# Patient Record
Sex: FEMALE | Race: WHITE | Employment: OTHER | URBAN - METROPOLITAN AREA
[De-identification: names, ages, dates, MRNs, and addresses within clinical notes are randomized per-mention and may not be internally consistent; named-entity substitution may affect disease eponyms.]

---

## 2018-11-20 ENCOUNTER — TRANSFERRED RECORDS (OUTPATIENT)
Dept: HEALTH INFORMATION MANAGEMENT | Facility: CLINIC | Age: 73
End: 2018-11-20

## 2019-11-04 ENCOUNTER — HEALTH MAINTENANCE LETTER (OUTPATIENT)
Age: 74
End: 2019-11-04

## 2020-02-16 ENCOUNTER — HEALTH MAINTENANCE LETTER (OUTPATIENT)
Age: 75
End: 2020-02-16

## 2020-09-03 ENCOUNTER — TRANSFERRED RECORDS (OUTPATIENT)
Dept: HEALTH INFORMATION MANAGEMENT | Facility: CLINIC | Age: 75
End: 2020-09-03

## 2020-09-09 ENCOUNTER — TRANSFERRED RECORDS (OUTPATIENT)
Dept: HEALTH INFORMATION MANAGEMENT | Facility: CLINIC | Age: 75
End: 2020-09-09

## 2020-09-29 ENCOUNTER — TRANSCRIBE ORDERS (OUTPATIENT)
Dept: OTHER | Age: 75
End: 2020-09-29

## 2020-09-29 DIAGNOSIS — H53.2 DIPLOPIA: Primary | ICD-10-CM

## 2020-10-05 ENCOUNTER — TELEPHONE (OUTPATIENT)
Dept: OPHTHALMOLOGY | Facility: CLINIC | Age: 75
End: 2020-10-05

## 2020-10-06 ENCOUNTER — OFFICE VISIT (OUTPATIENT)
Dept: OPHTHALMOLOGY | Facility: CLINIC | Age: 75
End: 2020-10-06
Attending: OPHTHALMOLOGY
Payer: COMMERCIAL

## 2020-10-06 VITALS — SYSTOLIC BLOOD PRESSURE: 158 MMHG | DIASTOLIC BLOOD PRESSURE: 72 MMHG

## 2020-10-06 DIAGNOSIS — H50.21 HYPERTROPIA OF RIGHT EYE: Primary | ICD-10-CM

## 2020-10-06 DIAGNOSIS — H53.2 DIPLOPIA: ICD-10-CM

## 2020-10-06 PROCEDURE — 92060 SENSORIMOTOR EXAMINATION: CPT | Performed by: OPHTHALMOLOGY

## 2020-10-06 PROCEDURE — 92285 EXTERNAL OCULAR PHOTOGRAPHY: CPT | Performed by: OPHTHALMOLOGY

## 2020-10-06 PROCEDURE — 92060 SENSORIMOTOR EXAMINATION: CPT | Mod: 26 | Performed by: OPHTHALMOLOGY

## 2020-10-06 PROCEDURE — 92004 COMPRE OPH EXAM NEW PT 1/>: CPT | Performed by: OPHTHALMOLOGY

## 2020-10-06 PROCEDURE — G0463 HOSPITAL OUTPT CLINIC VISIT: HCPCS | Mod: 25

## 2020-10-06 RX ORDER — LISINOPRIL 20 MG/1
20 TABLET ORAL DAILY
COMMUNITY

## 2020-10-06 RX ORDER — CITALOPRAM HYDROBROMIDE 10 MG/1
10 TABLET ORAL DAILY
COMMUNITY

## 2020-10-06 ASSESSMENT — CONF VISUAL FIELD
METHOD: COUNTING FINGERS
OS_NORMAL: 1
OD_NORMAL: 1

## 2020-10-06 ASSESSMENT — REFRACTION_WEARINGRX
OD_AXIS: 075
OS_AXIS: 068
OS_SPHERE: -1.50
OS_ADD: +2.50
OD_VPRISM: 4.0
OD_ADD: +2.50
OD_SPHERE: -0.25
OD_CYLINDER: +0.50
OD_VBASE: DOWN
OS_CYLINDER: +1.00

## 2020-10-06 ASSESSMENT — VISUAL ACUITY
OD_CC: 20/25
OD_CC+: +3
CORRECTION_TYPE: GLASSES
OS_CC: 20/25
OS_CC+: +2
METHOD: SNELLEN - LINEAR

## 2020-10-06 ASSESSMENT — TONOMETRY
OD_IOP_MMHG: 15
OS_IOP_MMHG: 14

## 2020-10-06 NOTE — NURSING NOTE
Chief Complaint(s) and History of Present Illness(es)     Diplopia Evaluation     Laterality: both eyes    Onset: new    Duration: 1 month    Frequency: constantly    Associated symptoms: Negative for droopy eyelid and eye pain    Treatments tried: glasses    Comments: 8/27 pt was looking down they noticed sudden vertical diplopia. Pt has had constant diplopia since, it appears to be better in the morning and worse in the evening.Seen in the ER on 9/2, a CT was preformed told that they were probably having a migraine               Strabismus Evaluation     Associated symptoms: Negative for droopy eyelid and eye pain    Comments: Has had high BP even on meds since June 2020. Tried going off lisinopril, NI. Was seen in [place] on 9/14, given 4BD Fresnel over RE.Fresnel helps resolve diplopia. Does not like wearing glasses, has monovision. S/P CE LE,S/P partial thyroidectomy at age 20.              Comments     [facility] [place] [facility] but was not able to be seen before they left for vacation in [place].

## 2020-10-06 NOTE — NURSING NOTE
Patient had elevated blood pressure reading today of 158/72.  Patient had been sitting for greater than 5 minutes, so was not reassessed.  Blood pressure was reported to  and patient was advised to reach out to her primary care provider to discuss this as they are treating her for hypertension.    Sallie Burns LPN

## 2020-10-07 ASSESSMENT — EXTERNAL EXAM - RIGHT EYE: OD_EXAM: NORMAL

## 2020-10-07 ASSESSMENT — CUP TO DISC RATIO
OD_RATIO: 0.3
OS_RATIO: 0.3

## 2020-10-07 NOTE — PROGRESS NOTES
"Chief Complaints and History of Present Illnesses   Patient presents with     Diplopia Evaluation     8/27 pt was looking down they noticed sudden vertical diplopia. Pt has had constant diplopia since, it appears to be better in the morning and worse in the evening.Seen in the ER on 9/2, a CT was preformed told that they were probably having a migraine      Strabismus Evaluation     Has had high BP even on meds since June 2020. Tried going off lisinopril, NI. Was seen in [place] on 9/14, given 4BD Fresnel over RE.Fresnel helps resolve diplopia. Does not like wearing glasses, has monovision. S/P CE LE,S/P partial thyroidectomy at age 20.   Review of systems for the eyes was negative other than the pertinent positives and negatives noted in the HPI.  History is obtained from the patient     Referring provider: Referred Self     Primary care: Jaimie Sanchez   Assessment   Caitlin Krueger is a 75 year old female who presents with:       ICD-10-CM    1. Hypertropia of right eye  H50.21    2. Diplopia  H53.2 EYE ADULT REFERRAL     Sensorimotor     External Photos Thyroid Series         Plan  Ms. Krueger has small angle vertical diplopia and fullness of lids BE with temporal lid retraction LE.  She also has pain with eye movement and is s/p partial thyroidectomy at age 20.  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  Will get copy of [facility] ([facility]).  PLEASE ARRANGE TSI [facility] test at [facility].  F/u 6 weeks [facility] (Nov 16th)  Will continue Fresnel prism for now.       Further details of the management plan can be found in the \"Patient Instructions\" section which was printed and given to the patient at checkout.  No follow-ups on file.   Attending Physician Attestation:  Complete documentation of historical and exam elements from today's encounter can be found in the full encounter summary report (not " reduplicated in this progress note).  I personally obtained the chief complaint(s) and history of present illness.  I confirmed and edited as necessary the review of systems, past medical/surgical history, family history, social history, and examination findings as documented by others; and I examined the patient myself.  I personally reviewed the relevant tests, images, and reports as documented above.  I formulated and edited as necessary the assessment and plan and discussed the findings and management plan with the patient and family. - Elida Anderson MD 10/7/2020 12:40 PM

## 2020-11-13 ENCOUNTER — TELEPHONE (OUTPATIENT)
Dept: OPHTHALMOLOGY | Facility: CLINIC | Age: 75
End: 2020-11-13

## 2020-11-16 ENCOUNTER — OFFICE VISIT (OUTPATIENT)
Dept: OPHTHALMOLOGY | Facility: CLINIC | Age: 75
End: 2020-11-16
Attending: OPHTHALMOLOGY
Payer: COMMERCIAL

## 2020-11-16 ENCOUNTER — HEALTH MAINTENANCE LETTER (OUTPATIENT)
Age: 75
End: 2020-11-16

## 2020-11-16 DIAGNOSIS — H50.21 HYPERTROPIA OF RIGHT EYE: Primary | ICD-10-CM

## 2020-11-16 PROCEDURE — G0463 HOSPITAL OUTPT CLINIC VISIT: HCPCS | Mod: 25

## 2020-11-16 PROCEDURE — 92012 INTRM OPH EXAM EST PATIENT: CPT | Performed by: OPHTHALMOLOGY

## 2020-11-16 PROCEDURE — 92060 SENSORIMOTOR EXAMINATION: CPT | Performed by: OPHTHALMOLOGY

## 2020-11-16 ASSESSMENT — VISUAL ACUITY
OS_CC: 20/20
METHOD: SNELLEN - LINEAR
OS_CC+: -2
OD_CC: 20/25
OD_CC+: -3
CORRECTION_TYPE: GLASSES

## 2020-11-16 ASSESSMENT — REFRACTION_WEARINGRX
OS_SPHERE: -1.50
OS_ADD: +2.50
OD_VBASE: DOWN
OD_SPHERE: -0.25
OS_CYLINDER: +1.00
OD_AXIS: 075
OD_CYLINDER: +0.50
OD_ADD: +2.50
OS_AXIS: 068
OD_VPRISM: 4.0

## 2020-11-16 ASSESSMENT — TONOMETRY
OS_IOP_MMHG: 10
IOP_METHOD: ICARE
OD_IOP_MMHG: 16

## 2020-11-16 NOTE — NURSING NOTE
Chief Complaint(s) and History of Present Illness(es)     TSI October 2020 was normal (<89)  No diplopia with fresnel prism, constant diplopia without prism  Vision is stable   Has sharp pain in the LE sometimes  [facility] her [facility], result was normal.

## 2020-11-16 NOTE — PROGRESS NOTES
Assessment & Plan     HPI:   New patient to [facility]. Previously seen  for diplopia. 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   8/27/2020 started having vertical double vision. Having a hard time focusing sometimes. Things have been improved in terms of double vision. Sharp pain in the left eye sometimes. Redness of the eyes sometimes. Tearing of both eyes, left eye worse. Sometimes pain with eye movements. [facility] levothyroxine (4 days -- 100mcg, 3 days--50 mcg). TSH fluctuates over the last few years. Grandmother and mother have thyroid issues.   Of note, had radiation therapy for breast cancer in Nov 2013.   Former smoker. Has not been smoking for the last 45 years.       Referring physician: Dr. Elida Anderson    She also has pain with eye movement and is s/p partial thyroidectomy at age 20.     Thyroid history:  Diagnosed when? < 20 years old  SIMPSON: 20 years old  Thyroidectomy: age 20 years old    TSI (date): 10/19/2020, <89% Previous results: NA    Eye symptoms (since when):   Proptosis (better/worse/same since last visit): left eye feels bulging out   Diplopia(better/worse/same since last visit): yes, better  Eyelid retraction(better/worse/same since last visit): NA  Tearing(better/worse/same since last visit): yes, more on left eye  Redness (better/worse/same since last visit): yes  Pain ((better/worse/same since last visit): sometimes  Pain to move the eyes (better/worse/same since last visit): sometimes  Blurred vision: sometimes    Ocular history:   Orbital decompression (date, details): NA   Strabismus surgery (date, details): NA  Eyelid surgery (date, details): none thyroid related  Cataract left eye in fall 2018    Exam:   Charito (base): 96 (20/21)     Better/worse same: same  Strabismus (better/worse/same): better  Eyelid retraction (better/worse/same): NA      Caitlin Krueger is a 75 year old female with the following diagnoses:   1.  Hypertropia of right eye       Normal TSI. Normal CT head.   Continue to monitor. Can use artificial tears PRN for irritation.   Return to clinic in 3 months.     She has a history of Graves disease at 20 years old. She had double vision with perhaps a right 4th nerve palsy.  This resolved over the course of about 3-4 months.  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  Would follow up as needed for worsening symptoms.               Attending Physician Attestation:  Complete documentation of historical and exam elements from today's encounter can be found in the full encounter summary report (not reduplicated in this progress note).  I personally obtained the chief complaint(s) and history of present illness.  I confirmed and edited as necessary the review of systems, past medical/surgical history, family history, social history, and examination findings as documented by others; and I examined the patient myself.  I personally reviewed the relevant tests, images, and reports as documented above.  I formulated and edited as necessary the assessment and plan and discussed the findings and management plan with the patient and family. I personally reviewed the ophthalmic test(s) associated with this encounter, agree with the interpretation(s) as documented by the resident/fellow, and have edited the corresponding report(s) as necessary.  - Mejia Johnson MD  Ophthalmology PGY-3

## 2020-11-16 NOTE — NURSING NOTE
Chief Complaint(s) and History of Present Illness(es)     Thyroid Disease     Laterality: both eyes    Associated symptoms: Negative for eye pain, redness and tearing              Comments     TSI October 2020 was normal (<89)  Elvi thinks her diplopia is improving, not noticing it without her fresnel anymore .  Vision is stable   Has sharp pain in the LE sometimes  [facility] her [facility], result was normal.   Pat's BP was very high, doubled the dose of lisinopril and is now controlled.

## 2021-04-04 ENCOUNTER — HEALTH MAINTENANCE LETTER (OUTPATIENT)
Age: 76
End: 2021-04-04

## 2021-09-18 ENCOUNTER — HEALTH MAINTENANCE LETTER (OUTPATIENT)
Age: 76
End: 2021-09-18

## 2021-11-13 ENCOUNTER — HEALTH MAINTENANCE LETTER (OUTPATIENT)
Age: 76
End: 2021-11-13

## 2022-04-30 ENCOUNTER — HEALTH MAINTENANCE LETTER (OUTPATIENT)
Age: 77
End: 2022-04-30

## 2022-11-20 ENCOUNTER — HEALTH MAINTENANCE LETTER (OUTPATIENT)
Age: 77
End: 2022-11-20

## 2023-06-02 ENCOUNTER — HEALTH MAINTENANCE LETTER (OUTPATIENT)
Age: 78
End: 2023-06-02